# Patient Record
Sex: MALE | Race: WHITE | NOT HISPANIC OR LATINO
[De-identification: names, ages, dates, MRNs, and addresses within clinical notes are randomized per-mention and may not be internally consistent; named-entity substitution may affect disease eponyms.]

---
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
## 2023-02-15 ENCOUNTER — TRANSCRIPTION ENCOUNTER (OUTPATIENT)
Age: 18
End: 2023-02-15

## 2023-02-15 ENCOUNTER — RX RENEWAL (OUTPATIENT)
Age: 18
End: 2023-02-15

## 2023-02-15 RX ORDER — FLUOXETINE HYDROCHLORIDE 40 MG/1
40 CAPSULE ORAL
Qty: 90 | Refills: 0 | Status: ACTIVE | COMMUNITY
Start: 2021-11-09 | End: 1900-01-01

## 2023-03-15 ENCOUNTER — APPOINTMENT (OUTPATIENT)
Dept: PLASTIC SURGERY | Facility: CLINIC | Age: 18
End: 2023-03-15
Payer: COMMERCIAL

## 2023-03-15 PROCEDURE — 99213 OFFICE O/P EST LOW 20 MIN: CPT

## 2023-03-22 NOTE — HISTORY OF PRESENT ILLNESS
[FreeTextEntry1] : 17-year-old man returns almost 2 years following top surgery with free nipple grafting.  He expresses concern regarding residual lateral excess chest tissue with dog earring.  He notes the tissue pokes into his arms when he walks.

## 2023-03-22 NOTE — PHYSICAL EXAM
[de-identified] : Incisions well-healed.  Prominent standing cutaneous deformities at the bilateral lateral chest at lateral extent of scars.  There is significant lateral chest soft tissue excess beyond the standing cutaneous deformity.

## 2023-03-22 NOTE — ASSESSMENT
[FreeTextEntry1] : We will plan for revision of standing cutaneous deformities of the bilateral chest.  It was reviewed with the patient that he will continue to have significant lateral chest wall soft tissue excess due to his overall body habitus.  We will plan to do these revisions as an office procedure, one side at a time.

## 2023-03-22 NOTE — REASON FOR VISIT
[FreeTextEntry1] : Dos - 4/26/21 s/p bilateral subtotal subcutaneous mastectomy with free nipple grafting.

## 2023-06-07 ENCOUNTER — APPOINTMENT (OUTPATIENT)
Dept: FAMILY MEDICINE | Facility: CLINIC | Age: 18
End: 2023-06-07
Payer: COMMERCIAL

## 2023-06-07 VITALS
BODY MASS INDEX: 32.78 KG/M2 | OXYGEN SATURATION: 98 % | HEIGHT: 64 IN | TEMPERATURE: 97.9 F | DIASTOLIC BLOOD PRESSURE: 78 MMHG | WEIGHT: 192 LBS | HEART RATE: 60 BPM | SYSTOLIC BLOOD PRESSURE: 108 MMHG

## 2023-06-07 DIAGNOSIS — F41.1 GENERALIZED ANXIETY DISORDER: ICD-10-CM

## 2023-06-07 PROCEDURE — 99213 OFFICE O/P EST LOW 20 MIN: CPT

## 2023-06-07 RX ORDER — DOXYCYCLINE 40 MG/1
40 CAPSULE ORAL
Qty: 90 | Refills: 0 | Status: DISCONTINUED | COMMUNITY
Start: 2021-11-24 | End: 2023-06-07

## 2023-06-07 RX ORDER — AMOXICILLIN AND CLAVULANATE POTASSIUM 875; 125 MG/1; MG/1
875-125 TABLET, COATED ORAL
Qty: 20 | Refills: 0 | Status: DISCONTINUED | COMMUNITY
Start: 2022-12-29 | End: 2023-06-07

## 2023-06-07 RX ORDER — CIPROFLOXACIN AND DEXAMETHASONE 3; 1 MG/ML; MG/ML
0.3-0.1 SUSPENSION/ DROPS AURICULAR (OTIC) TWICE DAILY
Qty: 1 | Refills: 0 | Status: DISCONTINUED | COMMUNITY
Start: 2022-12-29 | End: 2023-06-07

## 2023-06-07 NOTE — HISTORY OF PRESENT ILLNESS
[FreeTextEntry1] : Patient states he is having a left side mastectomy revision. It is an in office procedure.  He is having anxiety.  They will only be using local anesthesia.

## 2023-06-07 NOTE — PHYSICAL EXAM
[Normal] : normal rate, regular rhythm, normal S1 and S2 and no murmur heard [No Focal Deficits] : no focal deficits [Normal Gait] : normal gait [Normal Affect] : the affect was normal [Alert and Oriented x3] : oriented to person, place, and time [Normal Insight/Judgement] : insight and judgment were intact

## 2023-06-09 ENCOUNTER — APPOINTMENT (OUTPATIENT)
Dept: PLASTIC SURGERY | Facility: CLINIC | Age: 18
End: 2023-06-09
Payer: COMMERCIAL

## 2023-06-09 PROCEDURE — 13101 CMPLX RPR TRUNK 2.6-7.5 CM: CPT

## 2023-06-09 PROCEDURE — 13102 CMPLX RPR TRUNK ADDL 5CM/<: CPT

## 2023-06-09 NOTE — ASSESSMENT
[FreeTextEntry1] : KRISTIN PIERRE is a 18 year male who presents today Jun 07, 2023 with preoperative anxiety for a planned revision of cutaneous deformities of bilateral chest scheduled June 9 2023.\par \par Preoperative anxiety\par - Continue Fluoxetine 40mg Daily\par - Will give short supply of Lorazepam 0.5 mg BID PRN for severe anxiety/panic attacks\par - Patient instructed to inform surgeon of medication to ensure no contraindication to surgery. Patient instructed to keep medication in a safe location where others cannot reach it and discard unused/left over medication to a drug take back program. Patient educated on side effects like drowsiness, dizziness, loss of coordination, nausea, and/or confusion. Educated patient on importance of refraining from taking benzodiazepines with alcohol or driving. Educated patient to take medication only as prescribed and risk of addiction, withdrawal symptoms after long-term treatment, and rebound anxiety after short term treatment. I recommend incorporating non pharmacological interventions to reduce symptoms of anxiety and thereby improve functioning such as mediation, deep breathing, exercise, yoga, journaling, music/art therapy.

## 2023-06-09 NOTE — HISTORY OF PRESENT ILLNESS
[FreeTextEntry8] : KRISTIN PIERRE is a 18 year male who presents today Jun 07, 2023 with preoperative anxiety.\par \par Patient is having revision  of cutaneous deformities of bilateral chest June 9 2023. He is s/p bilateral subtotal subcutaneous mastectomy with free nipple grafting 4/23/21. He had generalized anxiety and takes fluoxetine 40mg daily. He has been very anxious for his upcoming procedure because he will be awake with local anesthesia. \par \par Patient denies any lightheadedness, dizziness, chest pain, palpitations, shortness or breath, or lower leg edema.

## 2023-06-11 NOTE — ASSESSMENT
[FreeTextEntry1] : Wound care and activity restrictions were reviewed.  Follow-up in 1 to 2 weeks for reassessment.

## 2023-06-11 NOTE — PROCEDURE
[Nl] : None [FreeTextEntry1] : Right chest skin excess. [FreeTextEntry2] : Right chest scar revision 8 cm [FreeTextEntry6] : Risks, benefits, and alternatives to right chest scar revision were discussed with the patient. Specific risks of bleeding, infection, hematoma, seroma, recurrence, damage to nearby structures, permanent scarring, chronic pain, and need for additional procedures, among other risks, were discussed the patient and all questions were answered. The patient consented to the procedure.\par \par The right chest was prepped with chlorhexidine and field block performed with 1% lidocaine with epinephrine buffered 5:1 with bicarbonate 8.6%. A total of 60 cc were infiltrated. After anesthesia of the area was confirmed, a lentiform incision was made around the excess skin.  Electrocautery was used to deepen the incision down to the Jenifer's layer.  The skin excess was then excised.  Hemostasis was obtained electrocautery.\par \par The wound was irrigated, closed in layers, and steri strips and a sterile dressing applied.  Total closure length was 8 cm.

## 2023-06-12 ENCOUNTER — APPOINTMENT (OUTPATIENT)
Dept: INTERNAL MEDICINE | Facility: CLINIC | Age: 18
End: 2023-06-12
Payer: COMMERCIAL

## 2023-06-12 VITALS
WEIGHT: 192 LBS | DIASTOLIC BLOOD PRESSURE: 62 MMHG | TEMPERATURE: 98.5 F | OXYGEN SATURATION: 98 % | BODY MASS INDEX: 32.78 KG/M2 | RESPIRATION RATE: 14 BRPM | HEIGHT: 64 IN | SYSTOLIC BLOOD PRESSURE: 110 MMHG | HEART RATE: 59 BPM

## 2023-06-12 DIAGNOSIS — R79.89 OTHER SPECIFIED ABNORMAL FINDINGS OF BLOOD CHEMISTRY: ICD-10-CM

## 2023-06-12 DIAGNOSIS — E66.3 OVERWEIGHT: ICD-10-CM

## 2023-06-12 DIAGNOSIS — Z91.89 OTHER SPECIFIED PERSONAL RISK FACTORS, NOT ELSEWHERE CLASSIFIED: ICD-10-CM

## 2023-06-12 DIAGNOSIS — Z78.9 OTHER SPECIFIED HEALTH STATUS: ICD-10-CM

## 2023-06-12 DIAGNOSIS — Z00.00 ENCOUNTER FOR GENERAL ADULT MEDICAL EXAMINATION W/OUT ABNORMAL FINDINGS: ICD-10-CM

## 2023-06-12 PROCEDURE — 36415 COLL VENOUS BLD VENIPUNCTURE: CPT

## 2023-06-12 PROCEDURE — 99395 PREV VISIT EST AGE 18-39: CPT | Mod: 25

## 2023-06-12 NOTE — HISTORY OF PRESENT ILLNESS
[FreeTextEntry1] : patient presents for physical\par  [de-identified] : KRISTIN PIERRE is a transgender 18 year male who presents today Jun 12, 2023 for wellness exam. \par \par His revision of chest wall surgery went well. He took the lorazepam only for preoperative anxiety. He is planning to take it again for his next surgery. \par He is on testosterone injection every 2 weeks. He was following up with a pediatric endocrinologist , but looking to transfer care to an adult endocrinologist. \par He speaks therapist once a week. He feels like Prozac is not working anymore. 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 \par \par \par \par

## 2023-06-12 NOTE — PHYSICAL EXAM
[Normal] : no rash [Coordination Grossly Intact] : coordination grossly intact [No Focal Deficits] : no focal deficits [Normal Gait] : normal gait [Normal Affect] : the affect was normal [Alert and Oriented x3] : oriented to person, place, and time [Normal Insight/Judgement] : insight and judgment were intact

## 2023-06-12 NOTE — ASSESSMENT
[FreeTextEntry1] : KRISTIN PIERRE is a 18 year transgender male who presents today Jun 12, 2023 for wellness exam.\par \par Health Maintenance \par -  Continue well balanced meals, daily exercise, and stress reduction techniques for overall health and wellness.\par - Continue annual eye exams, biannual dental exams\par \par Hormone Replacement\par - Continue testosterone 120mg (0.5ml) every 2 weeks\par - Will refer to adult endocrinology \par \par Anxiety\par - KIRK score- 11\par - PHQ 9 score- 4\par - Continue Fluoxetine 40mg\par - Lorazepam 0.5mg PRN preoperative anxiety only. \par - WiIl refer to behavioral health

## 2023-06-12 NOTE — HEALTH RISK ASSESSMENT
[Very Good] : ~his/her~ current health as very good [Good] : ~his/her~  mood as  good [Yes] : Yes [Monthly or less (1 pt)] : Monthly or less (1 point) [1 or 2 (0 pts)] : 1 or 2 (0 points) [Never (0 pts)] : Never (0 points) [No falls in past year] : Patient reported no falls in the past year [Student] : student [College] : College [Single] : single [Feels Safe at Home] : Feels safe at home [Fully functional (bathing, dressing, toileting, transferring, walking, feeding)] : Fully functional (bathing, dressing, toileting, transferring, walking, feeding) [Fully functional (using the telephone, shopping, preparing meals, housekeeping, doing laundry, using] : Fully functional and needs no help or supervision to perform IADLs (using the telephone, shopping, preparing meals, housekeeping, doing laundry, using transportation, managing medications and managing finances) [Smoke Detector] : smoke detector [Carbon Monoxide Detector] : carbon monoxide detector [Seat Belt] :  uses seat belt [Sunscreen] : uses sunscreen [No] : In the past 12 months have you used drugs other than those required for medical reasons? No [0] : 1) Little interest or pleasure doing things: Not at all (0) [1] : 2) Feeling down, depressed, or hopeless for several days (1) [PHQ-2 Negative - No further assessment needed] : PHQ-2 Negative - No further assessment needed [None] : None [Reports normal functional visual acuity (ie: able to read med bottle)] : Reports normal functional visual acuity [de-identified] : Endocrinology  [Audit-CScore] : 1 [de-identified] : walks 30 mins 3 times a week [de-identified] : regular  [[id number]] : 1 [Change in mental status noted] : No change in mental status noted [Language] : denies difficulty with language [Learning/Retaining New Information] : denies difficulty learning/retaining new information [Handling Complex Tasks] : denies difficulty handling complex tasks [Reasoning] : denies difficulty with reasoning [Spatial Ability and Orientation] : denies difficulty with spatial ability and orientation [Reports changes in hearing] : Reports no changes in hearing [Reports changes in vision] : Reports no changes in vision [Reports changes in dental health] : Reports no changes in dental health [de-identified] : Will be graduating in 2025 from [employer] in Education [de-identified] : wears glassess

## 2023-06-13 LAB
25(OH)D3 SERPL-MCNC: 31.5 NG/ML
ALBUMIN SERPL ELPH-MCNC: 4.8 G/DL
ALP BLD-CCNC: 58 U/L
ALT SERPL-CCNC: 67 U/L
ANION GAP SERPL CALC-SCNC: 10 MMOL/L
AST SERPL-CCNC: 37 U/L
BILIRUB SERPL-MCNC: 1 MG/DL
BUN SERPL-MCNC: 11 MG/DL
CALCIUM SERPL-MCNC: 9.8 MG/DL
CHLORIDE SERPL-SCNC: 101 MMOL/L
CHOLEST SERPL-MCNC: 240 MG/DL
CO2 SERPL-SCNC: 28 MMOL/L
CREAT SERPL-MCNC: 0.99 MG/DL
EGFR: 112 ML/MIN/1.73M2
GLUCOSE SERPL-MCNC: 108 MG/DL
HDLC SERPL-MCNC: 38 MG/DL
LDLC SERPL CALC-MCNC: NORMAL MG/DL
NONHDLC SERPL-MCNC: 202 MG/DL
POTASSIUM SERPL-SCNC: 5.1 MMOL/L
PROT SERPL-MCNC: 7.1 G/DL
SODIUM SERPL-SCNC: 140 MMOL/L
TRIGL SERPL-MCNC: 450 MG/DL
TSH SERPL-ACNC: 1.6 UIU/ML

## 2023-06-14 LAB — APO LP(A) SERPL-MCNC: 23.1 NMOL/L

## 2023-06-21 ENCOUNTER — APPOINTMENT (OUTPATIENT)
Dept: PLASTIC SURGERY | Facility: CLINIC | Age: 18
End: 2023-06-21
Payer: COMMERCIAL

## 2023-06-21 PROCEDURE — 99024 POSTOP FOLLOW-UP VISIT: CPT

## 2023-06-22 ENCOUNTER — TRANSCRIPTION ENCOUNTER (OUTPATIENT)
Age: 18
End: 2023-06-22

## 2023-06-22 NOTE — PHYSICAL EXAM
[de-identified] : Incision intact.  No erythema or induration.  Minimal ecchymoses.  Mild persistent soft tissue excess on left.

## 2023-06-22 NOTE — HISTORY OF PRESENT ILLNESS
[FreeTextEntry1] : The patient returns 13 days following right chest scar revision.  He states he is happier with the appearance of the area.

## 2023-06-22 NOTE — ASSESSMENT
[FreeTextEntry1] : Healing well following right chest scar revision.  The patient is interested in proceeding with the left side.  We will submit for insurance authorization.

## 2023-06-22 NOTE — REASON FOR VISIT
[Follow-Up: _____] : a [unfilled] follow-up visit [FreeTextEntry1] : 6/9/2023 - excision of right chest standing cutaneous deformity

## 2023-06-23 ENCOUNTER — APPOINTMENT (OUTPATIENT)
Dept: CARDIOLOGY | Facility: CLINIC | Age: 18
End: 2023-06-23
Payer: COMMERCIAL

## 2023-06-23 ENCOUNTER — NON-APPOINTMENT (OUTPATIENT)
Age: 18
End: 2023-06-23

## 2023-06-23 VITALS
HEIGHT: 63 IN | OXYGEN SATURATION: 98 % | DIASTOLIC BLOOD PRESSURE: 66 MMHG | HEART RATE: 70 BPM | SYSTOLIC BLOOD PRESSURE: 120 MMHG | BODY MASS INDEX: 35.08 KG/M2 | WEIGHT: 198 LBS

## 2023-06-23 DIAGNOSIS — E78.5 HYPERLIPIDEMIA, UNSPECIFIED: ICD-10-CM

## 2023-06-23 PROCEDURE — 99203 OFFICE O/P NEW LOW 30 MIN: CPT | Mod: 25

## 2023-06-23 PROCEDURE — 93000 ELECTROCARDIOGRAM COMPLETE: CPT

## 2023-06-23 NOTE — HISTORY OF PRESENT ILLNESS
[FreeTextEntry1] : 18 year old transgender (Female-->male) presents for a cardiac evaluation. Hypertriglyceridemia over 300 since at least 2019. Patient now on testosterone therapy, which was started in 2020, however elevated triglyceride levels in the 300s were seen prior to this.\par \par No history of pancreatitis.\par \par Patient is college student [employer]. During the school year diet is unhealthy.\par \par Patient has no chest pain, dyspnea or palpitations.\par \par There is no known family history in first degree relatives of cardiovascular disease.\par \par There is no history of MI, CVA, CHF, or previous coronary intervention.\par

## 2023-06-23 NOTE — DISCUSSION/SUMMARY
[FreeTextEntry1] : 1. Hypertriglyceridemia: patient with no history of pancreatitis. Reviewed lipid panels dating back to 2019. Persistently elevated. Recommend low fat diet. Start Lovaza 2gm BID. Recheck lipid panel in 2 months.\par \par Follow up prior to returning to school in the fall. [EKG obtained to assist in diagnosis and management of assessed problem(s)] : EKG obtained to assist in diagnosis and management of assessed problem(s)

## 2023-06-26 ENCOUNTER — TRANSCRIPTION ENCOUNTER (OUTPATIENT)
Age: 18
End: 2023-06-26

## 2023-06-27 ENCOUNTER — TRANSCRIPTION ENCOUNTER (OUTPATIENT)
Age: 18
End: 2023-06-27

## 2023-07-25 ENCOUNTER — RX CHANGE (OUTPATIENT)
Age: 18
End: 2023-07-25

## 2023-07-31 ENCOUNTER — APPOINTMENT (OUTPATIENT)
Dept: TRANSGENDER CARE | Facility: CLINIC | Age: 18
End: 2023-07-31

## 2023-07-31 ENCOUNTER — APPOINTMENT (OUTPATIENT)
Dept: TRANSGENDER CARE | Facility: CLINIC | Age: 18
End: 2023-07-31
Payer: COMMERCIAL

## 2023-07-31 ENCOUNTER — NON-APPOINTMENT (OUTPATIENT)
Age: 18
End: 2023-07-31

## 2023-07-31 VITALS
TEMPERATURE: 97.7 F | HEIGHT: 63 IN | WEIGHT: 195 LBS | OXYGEN SATURATION: 98 % | DIASTOLIC BLOOD PRESSURE: 76 MMHG | RESPIRATION RATE: 16 BRPM | SYSTOLIC BLOOD PRESSURE: 115 MMHG | HEART RATE: 60 BPM | BODY MASS INDEX: 34.55 KG/M2

## 2023-07-31 DIAGNOSIS — F39 UNSPECIFIED MOOD [AFFECTIVE] DISORDER: ICD-10-CM

## 2023-07-31 DIAGNOSIS — Z79.890 HORMONE REPLACEMENT THERAPY: ICD-10-CM

## 2023-07-31 DIAGNOSIS — E55.9 VITAMIN D DEFICIENCY, UNSPECIFIED: ICD-10-CM

## 2023-07-31 DIAGNOSIS — L70.9 ACNE, UNSPECIFIED: ICD-10-CM

## 2023-07-31 PROCEDURE — 36415 COLL VENOUS BLD VENIPUNCTURE: CPT

## 2023-07-31 PROCEDURE — 99205 OFFICE O/P NEW HI 60 MIN: CPT | Mod: 25

## 2023-07-31 RX ORDER — LORAZEPAM 0.5 MG/1
0.5 TABLET ORAL TWICE DAILY
Qty: 10 | Refills: 0 | Status: DISCONTINUED | COMMUNITY
Start: 2023-06-07 | End: 2023-07-31

## 2023-07-31 RX ORDER — DAPSONE 50 MG/G
5 GEL TOPICAL
Qty: 60 | Refills: 0 | Status: ACTIVE | COMMUNITY
Start: 2021-11-15

## 2023-07-31 RX ORDER — TRIFAROTENE 50 UG/G
0.01 CREAM TOPICAL
Qty: 45 | Refills: 0 | Status: ACTIVE | COMMUNITY
Start: 2021-11-24

## 2023-08-01 LAB
ANTI-MUELLERIAN HORMONE: 3.88 NG/ML
ESTIMATED AVERAGE GLUCOSE: 100 MG/DL
ESTRADIOL SERPL-MCNC: 27 PG/ML
FSH SERPL-MCNC: 6.5 IU/L
HBA1C MFR BLD HPLC: 5.1 %
HBV SURFACE AG SER QL: NONREACTIVE
HCV AB SER QL: NONREACTIVE
HCV S/CO RATIO: 0.11 S/CO
HIV1+2 AB SPEC QL IA.RAPID: NONREACTIVE
LH SERPL-ACNC: 8.3 IU/L
TSH SERPL-ACNC: 1.89 UIU/ML

## 2023-08-02 LAB — TESTOST SERPL-MCNC: 210 NG/DL

## 2023-08-07 NOTE — HISTORY OF PRESENT ILLNESS
[FreeTextEntry1] : YOVANI PIERRE is a 18 year old, transmale seen on 2023 for intial transgender care program intake visit.  Preferred Pronouns: he/him Sexual orientation: pansexual Gender identity: male Assigned female at birth Specific Terms for Body Parts:  top/bottom Call pt: Yovani  Reason for Appointment: Yovani is a 18 year old AFAB trans man (he/him) interested in transferring GAHT. Dr. Schumacher.  COVID Screenin COVID shots. 1+ MPX shots.  Transition HX + Present Life: He felt different in middle school, but didn't have the language to describe it, then started watching YouTube videos in 10th Grade from trans people and started coming to terms. He came out to friends , then got top surgery . Friends are very supportive and many are part of the LGBTQ+ community. He relays that his family is very supportive; his mother was right away, his father took some time but now he is supportive. He lives with his mother, brother, and mother's bf and feels safe at home. Food secure and reliable transportation.  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 He is out and people are supportive.  Mental Health: He is dxed with anxiety and depression. Takes Prozac 40mg from his PCP. Ernesto Strickland is his therapist. He will be seeing a psychiatrist at [facility] starting next week. Reports no sleeping concerns. Never been inpatient. Mother has OCD tendencies and father has some anxiety.  Psychiatry:  Psychology:   History of mental health admission:  History of Suicidal/homicidal ideation & Self harm:    Endocrinology, Primary Care, GYN: He has been on GAHT 2020-now. No personal or family hx of endo disorders. Never been to a GYN; would like referrals.  Coping: He likes to make art, go on walks, hang out with pets, and take showers to cope with stress. He turns to mother, brother, and friends for support.  Feelings of Gender Dysphoria/ Body Dysmorphia: He relays that a lot of his dysphoria has gone away, but he sometimes feels dysphoric when comparing himself to cis men.  Tattoos/ Piercing: Prof done.  Cigarette, Vaping, Marijuana, Alcohol, and Drug Use: He drinks alcohol weekly and smokes cannabis and vapes nicotine a few times a week. Reports no other substance use.  Reproductive Endocrinology: No interest.  Gender Affirming Surgery: He had top surgery in 2021 with Dr. Licona. Revision scheduled for 2023. Not currently interested in other surgeries.  Name Change + Gender Marker: Done.  Nutrition: Reports no nutritional concerns. 5'3, 195 lbs. 3 meals. He walks 3 times a week and swims once a week.  Sexual Health: He is not in a relationship and was last sexually active 2 months ago. 5 partners in the past year. Reports no STI/HIV symptoms.  Goals of Trans care: 1) Transfer GAHT for adult servcies

## 2023-08-07 NOTE — SOCIAL HISTORY
[Sexually Active] : The patient is sexually active [Sometimes] : sometimes [Vaginal] : vaginal [Oral-Receptive (pt. mouth)] : oral-receptive (pt. mouth) [To Pt Genitalia] : pt genitalia [Male ___] : [unfilled] male [Date of most recent sexual encounter ___] : ~His/Her~ most recent sexual encounter was [unfilled] [Partnership for Health – Safe Sex Evidence Based Intervention] : The Partnership for Health Safe Sex Evidence Based Intervention was applied [Loss Frame Message] :  and a loss frame message was provided.

## 2023-08-11 ENCOUNTER — APPOINTMENT (OUTPATIENT)
Dept: PLASTIC SURGERY | Facility: CLINIC | Age: 18
End: 2023-08-11
Payer: COMMERCIAL

## 2023-08-11 DIAGNOSIS — R22.9 LOCALIZED SWELLING, MASS AND LUMP, UNSPECIFIED: ICD-10-CM

## 2023-08-11 PROCEDURE — 13101 CMPLX RPR TRUNK 2.6-7.5 CM: CPT

## 2023-08-11 PROCEDURE — 13102 CMPLX RPR TRUNK ADDL 5CM/<: CPT

## 2023-08-11 NOTE — PROCEDURE
[Nl] : None [FreeTextEntry1] : Gender dysphoria; left chest soft tissue excess [FreeTextEntry2] : Excision and repair left chest soft tissue excess 14 cm [FreeTextEntry6] : Risks, benefits, and alternatives to excision of the area were discussed with the patient. Specific risks of bleeding, infection, hematoma, seroma, recurrence, damage to nearby structures, permanent scarring, chronic pain, and need for additional procedures, among other risks, were discussed the patient and all questions were answered. The patient consented to the procedure.  The left chest was prepped with povidine iodine and field block performed with 1% lidocaine with epinephrine buffered 5:1 with bicarbonate 8.6%. A total of 60 cc were infiltrated. After anesthesia of the area was confirmed, a lentiform incision was made around the soft tissue excess, which was then excised down to the level of the superficial chest wall fascia.  Hemostasis was obtained electrocautery and noted to be excellent.  The wound was irrigated, closed in layers, and steri strips and a sterile dressing applied.  Total closure length was 14 cm

## 2023-08-14 ENCOUNTER — NON-APPOINTMENT (OUTPATIENT)
Age: 18
End: 2023-08-14

## 2023-08-14 LAB
ALBUMIN SERPL ELPH-MCNC: 5.3 G/DL
ALP BLD-CCNC: 62 U/L
ALT SERPL-CCNC: 83 U/L
ANION GAP SERPL CALC-SCNC: 14 MMOL/L
AST SERPL-CCNC: 51 U/L
BILIRUB SERPL-MCNC: 1.2 MG/DL
BUN SERPL-MCNC: 16 MG/DL
CALCIUM SERPL-MCNC: 10.5 MG/DL
CHLORIDE SERPL-SCNC: 98 MMOL/L
CO2 SERPL-SCNC: 26 MMOL/L
CREAT SERPL-MCNC: 0.99 MG/DL
DHEA-SULFATE, SERUM: 584 UG/DL
EGFR: 112 ML/MIN/1.73M2
GLUCOSE SERPL-MCNC: 87 MG/DL
HCT VFR BLD CALC: 50.4 %
HGB BLD-MCNC: 17.7 G/DL
MCHC RBC-ENTMCNC: 32.1 PG
MCHC RBC-ENTMCNC: 35.1 GM/DL
MCV RBC AUTO: 91.3 FL
PLATELET # BLD AUTO: 361 K/UL
POTASSIUM SERPL-SCNC: 4.3 MMOL/L
PROT SERPL-MCNC: 8 G/DL
RBC # BLD: 5.52 M/UL
RBC # FLD: 12.5 %
SODIUM SERPL-SCNC: 138 MMOL/L
WBC # FLD AUTO: 9.24 K/UL

## 2023-08-18 ENCOUNTER — APPOINTMENT (OUTPATIENT)
Dept: PLASTIC SURGERY | Facility: CLINIC | Age: 18
End: 2023-08-18

## 2023-08-24 ENCOUNTER — APPOINTMENT (OUTPATIENT)
Dept: CARDIOLOGY | Facility: CLINIC | Age: 18
End: 2023-08-24
Payer: COMMERCIAL

## 2023-08-24 VITALS
HEIGHT: 63 IN | OXYGEN SATURATION: 98 % | WEIGHT: 190 LBS | HEART RATE: 90 BPM | SYSTOLIC BLOOD PRESSURE: 122 MMHG | BODY MASS INDEX: 33.66 KG/M2 | DIASTOLIC BLOOD PRESSURE: 66 MMHG

## 2023-08-24 DIAGNOSIS — E78.2 MIXED HYPERLIPIDEMIA: ICD-10-CM

## 2023-08-24 DIAGNOSIS — Z13.6 ENCOUNTER FOR SCREENING FOR CARDIOVASCULAR DISORDERS: ICD-10-CM

## 2023-08-24 PROCEDURE — 99213 OFFICE O/P EST LOW 20 MIN: CPT

## 2023-08-24 NOTE — DISCUSSION/SUMMARY
[FreeTextEntry1] : 1. Hypertriglyceridemia: patient with no history of pancreatitis. Reviewed lipid panels dating back to 2019. Persistently elevated. Recommend low fat diet. Continue Lovaza 2gm BID. Will obtain most recent lipid panel  Follow up in 1 year.

## 2023-08-24 NOTE — HISTORY OF PRESENT ILLNESS
[FreeTextEntry1] : Historical Perspective: 18 year old transgender (Female-->male) presents for a cardiac evaluation. Hypertriglyceridemia over 300 since at least 2019. Patient now on testosterone therapy, which was started in 2020, however elevated triglyceride levels in the 300s were seen prior to this.  No history of pancreatitis.  Patient is college student [employer]. During the school year diet is unhealthy.  Patient has no chest pain, dyspnea or palpitations.  There is no known family history in first degree relatives of cardiovascular disease.  There is no history of MI, CVA, CHF, or previous coronary intervention.  Current Health Status: Patient with no chest pain, SOB, or palpitations. No hospitalizations since seeing me last. Remains compliant with his medications and reports no adverse effects.

## 2023-09-06 ENCOUNTER — NON-APPOINTMENT (OUTPATIENT)
Age: 18
End: 2023-09-06

## 2023-11-03 ENCOUNTER — TRANSCRIPTION ENCOUNTER (OUTPATIENT)
Age: 18
End: 2023-11-03

## 2023-11-22 ENCOUNTER — APPOINTMENT (OUTPATIENT)
Dept: TRANSGENDER CARE | Facility: CLINIC | Age: 18
End: 2023-11-22
Payer: COMMERCIAL

## 2023-11-22 ENCOUNTER — TRANSCRIPTION ENCOUNTER (OUTPATIENT)
Age: 18
End: 2023-11-22

## 2023-11-22 VITALS
HEART RATE: 88 BPM | WEIGHT: 188 LBS | SYSTOLIC BLOOD PRESSURE: 128 MMHG | OXYGEN SATURATION: 98 % | DIASTOLIC BLOOD PRESSURE: 70 MMHG | HEIGHT: 63 IN | BODY MASS INDEX: 33.31 KG/M2 | TEMPERATURE: 98.5 F

## 2023-11-22 PROCEDURE — 36415 COLL VENOUS BLD VENIPUNCTURE: CPT

## 2023-11-22 PROCEDURE — 99214 OFFICE O/P EST MOD 30 MIN: CPT | Mod: 25

## 2023-11-22 RX ORDER — TOPIRAMATE 25 MG/1
25 TABLET, FILM COATED ORAL DAILY
Refills: 0 | Status: ACTIVE | COMMUNITY
Start: 2023-11-22

## 2023-11-22 RX ORDER — LORAZEPAM 0.5 MG/1
0.5 TABLET ORAL
Qty: 15 | Refills: 0 | Status: ACTIVE | COMMUNITY
Start: 2023-11-22

## 2023-11-23 ENCOUNTER — TRANSCRIPTION ENCOUNTER (OUTPATIENT)
Age: 18
End: 2023-11-23

## 2023-11-23 LAB
25(OH)D3 SERPL-MCNC: 28.5 NG/ML
ALBUMIN SERPL ELPH-MCNC: 5.4 G/DL
ALP BLD-CCNC: 66 U/L
ALT SERPL-CCNC: 49 U/L
ANION GAP SERPL CALC-SCNC: 12 MMOL/L
AST SERPL-CCNC: 31 U/L
BASOPHILS # BLD AUTO: 0.06 K/UL
BASOPHILS NFR BLD AUTO: 0.7 %
BILIRUB SERPL-MCNC: 1.2 MG/DL
BUN SERPL-MCNC: 13 MG/DL
CALCIUM SERPL-MCNC: 10.3 MG/DL
CHLORIDE SERPL-SCNC: 102 MMOL/L
CHOLEST SERPL-MCNC: 242 MG/DL
CO2 SERPL-SCNC: 26 MMOL/L
CREAT SERPL-MCNC: 0.86 MG/DL
EGFR: 127 ML/MIN/1.73M2
EOSINOPHIL # BLD AUTO: 0.11 K/UL
EOSINOPHIL NFR BLD AUTO: 1.3 %
ESTIMATED AVERAGE GLUCOSE: 108 MG/DL
ESTRADIOL SERPL-MCNC: 25 PG/ML
GLUCOSE SERPL-MCNC: 87 MG/DL
HBA1C MFR BLD HPLC: 5.4 %
HCT VFR BLD CALC: 51.6 %
HDLC SERPL-MCNC: 43 MG/DL
HGB BLD-MCNC: 17.5 G/DL
IMM GRANULOCYTES NFR BLD AUTO: 0.2 %
LDLC SERPL CALC-MCNC: 140 MG/DL
LYMPHOCYTES # BLD AUTO: 2.11 K/UL
LYMPHOCYTES NFR BLD AUTO: 24.8 %
MAN DIFF?: NORMAL
MCHC RBC-ENTMCNC: 30.3 PG
MCHC RBC-ENTMCNC: 33.9 GM/DL
MCV RBC AUTO: 89.3 FL
MONOCYTES # BLD AUTO: 0.43 K/UL
MONOCYTES NFR BLD AUTO: 5.1 %
NEUTROPHILS # BLD AUTO: 5.78 K/UL
NEUTROPHILS NFR BLD AUTO: 67.9 %
NONHDLC SERPL-MCNC: 200 MG/DL
PLATELET # BLD AUTO: 370 K/UL
POTASSIUM SERPL-SCNC: 4.4 MMOL/L
PROT SERPL-MCNC: 8 G/DL
RBC # BLD: 5.78 M/UL
RBC # FLD: 12.7 %
SODIUM SERPL-SCNC: 140 MMOL/L
TESTOST SERPL-MCNC: 445 NG/DL
TRIGL SERPL-MCNC: 326 MG/DL
TSH SERPL-ACNC: 1.15 UIU/ML
WBC # FLD AUTO: 8.51 K/UL

## 2023-11-24 ENCOUNTER — TRANSCRIPTION ENCOUNTER (OUTPATIENT)
Age: 18
End: 2023-11-24

## 2023-12-20 ENCOUNTER — OUTPATIENT (OUTPATIENT)
Dept: OUTPATIENT SERVICES | Facility: HOSPITAL | Age: 18
LOS: 1 days | Discharge: HOME | End: 2023-12-20

## 2023-12-20 DIAGNOSIS — D45 POLYCYTHEMIA VERA: ICD-10-CM

## 2024-01-02 ENCOUNTER — RESULT REVIEW (OUTPATIENT)
Age: 19
End: 2024-01-02

## 2024-01-02 ENCOUNTER — APPOINTMENT (OUTPATIENT)
Dept: HEMATOLOGY ONCOLOGY | Facility: CLINIC | Age: 19
End: 2024-01-02
Payer: COMMERCIAL

## 2024-01-02 VITALS
OXYGEN SATURATION: 100 % | HEIGHT: 63.07 IN | WEIGHT: 188.91 LBS | SYSTOLIC BLOOD PRESSURE: 118 MMHG | BODY MASS INDEX: 33.47 KG/M2 | HEART RATE: 50 BPM | DIASTOLIC BLOOD PRESSURE: 72 MMHG | TEMPERATURE: 97.6 F | RESPIRATION RATE: 16 BRPM

## 2024-01-02 LAB
BASOPHILS # BLD AUTO: 0.04 K/UL — SIGNIFICANT CHANGE UP (ref 0–0.2)
BASOPHILS # BLD AUTO: 0.04 K/UL — SIGNIFICANT CHANGE UP (ref 0–0.2)
BASOPHILS NFR BLD AUTO: 0.5 % — SIGNIFICANT CHANGE UP (ref 0–2)
BASOPHILS NFR BLD AUTO: 0.5 % — SIGNIFICANT CHANGE UP (ref 0–2)
EOSINOPHIL # BLD AUTO: 0.16 K/UL — SIGNIFICANT CHANGE UP (ref 0–0.5)
EOSINOPHIL # BLD AUTO: 0.16 K/UL — SIGNIFICANT CHANGE UP (ref 0–0.5)
EOSINOPHIL NFR BLD AUTO: 2.1 % — SIGNIFICANT CHANGE UP (ref 0–6)
EOSINOPHIL NFR BLD AUTO: 2.1 % — SIGNIFICANT CHANGE UP (ref 0–6)
HCT VFR BLD CALC: 47.6 % — SIGNIFICANT CHANGE UP (ref 39–50)
HCT VFR BLD CALC: 47.6 % — SIGNIFICANT CHANGE UP (ref 39–50)
HGB BLD-MCNC: 16.9 G/DL — SIGNIFICANT CHANGE UP (ref 13–17)
HGB BLD-MCNC: 16.9 G/DL — SIGNIFICANT CHANGE UP (ref 13–17)
IMM GRANULOCYTES NFR BLD AUTO: 0.3 % — SIGNIFICANT CHANGE UP (ref 0–0.9)
IMM GRANULOCYTES NFR BLD AUTO: 0.3 % — SIGNIFICANT CHANGE UP (ref 0–0.9)
LYMPHOCYTES # BLD AUTO: 2.2 K/UL — SIGNIFICANT CHANGE UP (ref 1–3.3)
LYMPHOCYTES # BLD AUTO: 2.2 K/UL — SIGNIFICANT CHANGE UP (ref 1–3.3)
LYMPHOCYTES # BLD AUTO: 28.4 % — SIGNIFICANT CHANGE UP (ref 13–44)
LYMPHOCYTES # BLD AUTO: 28.4 % — SIGNIFICANT CHANGE UP (ref 13–44)
MCHC RBC-ENTMCNC: 31.3 PG — SIGNIFICANT CHANGE UP (ref 27–34)
MCHC RBC-ENTMCNC: 31.3 PG — SIGNIFICANT CHANGE UP (ref 27–34)
MCHC RBC-ENTMCNC: 35.5 G/DL — SIGNIFICANT CHANGE UP (ref 32–36)
MCHC RBC-ENTMCNC: 35.5 G/DL — SIGNIFICANT CHANGE UP (ref 32–36)
MCV RBC AUTO: 88.1 FL — SIGNIFICANT CHANGE UP (ref 80–100)
MCV RBC AUTO: 88.1 FL — SIGNIFICANT CHANGE UP (ref 80–100)
MONOCYTES # BLD AUTO: 0.4 K/UL — SIGNIFICANT CHANGE UP (ref 0–0.9)
MONOCYTES # BLD AUTO: 0.4 K/UL — SIGNIFICANT CHANGE UP (ref 0–0.9)
MONOCYTES NFR BLD AUTO: 5.2 % — SIGNIFICANT CHANGE UP (ref 2–14)
MONOCYTES NFR BLD AUTO: 5.2 % — SIGNIFICANT CHANGE UP (ref 2–14)
NEUTROPHILS # BLD AUTO: 4.93 K/UL — SIGNIFICANT CHANGE UP (ref 1.8–7.4)
NEUTROPHILS # BLD AUTO: 4.93 K/UL — SIGNIFICANT CHANGE UP (ref 1.8–7.4)
NEUTROPHILS NFR BLD AUTO: 63.5 % — SIGNIFICANT CHANGE UP (ref 43–77)
NEUTROPHILS NFR BLD AUTO: 63.5 % — SIGNIFICANT CHANGE UP (ref 43–77)
NRBC # BLD: 0 /100 WBCS — SIGNIFICANT CHANGE UP (ref 0–0)
NRBC # BLD: 0 /100 WBCS — SIGNIFICANT CHANGE UP (ref 0–0)
PLATELET # BLD AUTO: 303 K/UL — SIGNIFICANT CHANGE UP (ref 150–400)
PLATELET # BLD AUTO: 303 K/UL — SIGNIFICANT CHANGE UP (ref 150–400)
RBC # BLD: 5.4 M/UL — SIGNIFICANT CHANGE UP (ref 4.2–5.8)
RBC # BLD: 5.4 M/UL — SIGNIFICANT CHANGE UP (ref 4.2–5.8)
RBC # FLD: 12.5 % — SIGNIFICANT CHANGE UP (ref 10.3–14.5)
RBC # FLD: 12.5 % — SIGNIFICANT CHANGE UP (ref 10.3–14.5)
WBC # BLD: 7.75 K/UL — SIGNIFICANT CHANGE UP (ref 3.8–10.5)
WBC # BLD: 7.75 K/UL — SIGNIFICANT CHANGE UP (ref 3.8–10.5)
WBC # FLD AUTO: 7.75 K/UL — SIGNIFICANT CHANGE UP (ref 3.8–10.5)
WBC # FLD AUTO: 7.75 K/UL — SIGNIFICANT CHANGE UP (ref 3.8–10.5)

## 2024-01-02 PROCEDURE — 99205 OFFICE O/P NEW HI 60 MIN: CPT

## 2024-01-05 ENCOUNTER — LABORATORY RESULT (OUTPATIENT)
Age: 19
End: 2024-01-05

## 2024-01-05 ENCOUNTER — APPOINTMENT (OUTPATIENT)
Dept: TRANSGENDER CARE | Facility: CLINIC | Age: 19
End: 2024-01-05
Payer: COMMERCIAL

## 2024-01-05 VITALS
WEIGHT: 190 LBS | DIASTOLIC BLOOD PRESSURE: 68 MMHG | BODY MASS INDEX: 33.66 KG/M2 | TEMPERATURE: 98.3 F | SYSTOLIC BLOOD PRESSURE: 100 MMHG | OXYGEN SATURATION: 97 % | HEART RATE: 67 BPM | HEIGHT: 63 IN

## 2024-01-05 DIAGNOSIS — Z79.899 TRANSSEXUALISM: ICD-10-CM

## 2024-01-05 DIAGNOSIS — E55.9 VITAMIN D DEFICIENCY, UNSPECIFIED: ICD-10-CM

## 2024-01-05 DIAGNOSIS — E66.9 OBESITY, UNSPECIFIED: ICD-10-CM

## 2024-01-05 DIAGNOSIS — F12.90 CANNABIS USE, UNSPECIFIED, UNCOMPLICATED: ICD-10-CM

## 2024-01-05 DIAGNOSIS — R73.09 OTHER ABNORMAL GLUCOSE: ICD-10-CM

## 2024-01-05 DIAGNOSIS — F64.0 TRANSSEXUALISM: ICD-10-CM

## 2024-01-05 PROCEDURE — G2211 COMPLEX E/M VISIT ADD ON: CPT

## 2024-01-05 PROCEDURE — 36415 COLL VENOUS BLD VENIPUNCTURE: CPT

## 2024-01-05 PROCEDURE — 99215 OFFICE O/P EST HI 40 MIN: CPT | Mod: 25

## 2024-01-05 NOTE — HISTORY OF PRESENT ILLNESS
[FreeTextEntry1] : YOVANI PIERRE is a 18 year old, transmale seen on 07/31/2023 for intial transgender care program intake visit.  Preferred Pronouns: he/him Sexual orientation: pansexual Gender identity: male Assigned female at birth Specific Terms for Body Parts:  top/bottom Call pt: Yovani  Pt went to Heme/Onc and had impovved Hgb/Hct and did nto need phlepbotgyl. Doing well.  Started teststoene in 2019.  Last dose of testseroen was 12/30/23.  q2w  no problems with inejctions.  doing well.  No complaints.   Doing well in College. Jr yr.  touch semsester, but did well.

## 2024-01-05 NOTE — SOCIAL HISTORY
[Sexually Active] : The patient is sexually active [Vaginal] : vaginal [Oral-Receptive (pt. mouth)] : oral-receptive (pt. mouth) [To Pt Genitalia] : pt genitalia [Male ___] : [unfilled] male [Date of most recent sexual encounter ___] : ~His/Her~ most recent sexual encounter was [unfilled] [Partnership for Health – Safe Sex Evidence Based Intervention] : The Partnership for Health Safe Sex Evidence Based Intervention was applied [Loss Frame Message] :  and a loss frame message was provided. [Always] : always

## 2024-01-06 NOTE — REASON FOR VISIT
[Initial Consultation] : an initial consultation for [Parent] : parent [FreeTextEntry2] : polycythemia

## 2024-01-06 NOTE — ASSESSMENT
[FreeTextEntry1] : Yovani is a 18 year old transgender male (female to male) with history of hypertriglyceridemia, here for further evaluation of polycythemia. He was referred by Endocrinologist MD Mejia. Polycythemia noted in EMR since 6/12/23 H/H 17.1/51.6. Recent labs from 11/22/23 reported H/H 17.5/51.6. Patient has been on hormone replacement of testosterone since 2020. Currently taking 120mg (0.5ml) every 2 weeks. Patient was advised to have a phlebotomy done at a blood donation center, but was unable to due to polycythemia.   Plan: -Draw CBC, CMP, MICHELLE 2, EPO  -Advised patient to schedule one possible phlebotomy to obtain a HCT goal < 50 at this time.  -Patient states [facility] and would like to have phlebotomies done closer to home. Will look into this.   Patient seen and evaluated, case and management discussed with MD Josué Cobb  Attending Attestation Patient was seen and examined with NP Chiquis Weaver.  I was present in all pertinent parts of the interview and this impression is my own.  Patient with history of a secondary erythrocytosis HCT as high as 55% at times, most recently 51.6%.  Concerns over hypervisors in setting of testosterones use.  Options including halting testosterone versus observing on lower doses, and continuing current dose with therapeutic phlebotomy were offered. Patent wishes to continued current dose with  phlebotomy. Explained to patient that this does not completely eliminate the risk of cardiovascular and cerebrovascular disease from the use of testosterones, it only mitigates them roughly 50% in  studies with cis gendered men using the testosterone for hypogonadism.  Patient lives very far away in [place], would start phlebotomy here this upcoming week, to buy time to attempt to make an arrangement for patient to have phlebotomy [place] an [place] location possibly around [facility].

## 2024-01-06 NOTE — ADDENDUM
[FreeTextEntry1] : 1/3/23: KIANA Weaver: Called and reviewed CBC with patient. H/H 16.9/47.6. HCT within goal at this time. Will cancel upcoming phlebotomy appointment.  Additional labs are pending.

## 2024-01-06 NOTE — HISTORY OF PRESENT ILLNESS
[Date: ____________] : Patient's last distress assessment performed on [unfilled]. [0 - No Distress] : Distress Level: 0 [100: Normal, no complaints, no evidence of disease.] : 100: Normal, no complaints, no evidence of disease. [ECOG Performance Status: 0 - Fully active, able to carry on all pre-disease performance without restriction] : Performance Status: 0 - Fully active, able to carry on all pre-disease performance without restriction [de-identified] : Yovani is a 18 year old transgender male (female to male) with history of hypertriglyceridemia, here for further evaluation of polycythemia. He was referred by Endocrinologist MD Mejia. Polycythemia noted in EMR since 6/12/23 H/H 17.1/51.6. Recent labs from 11/22/23 reported H/H 17.5/51.6. Patient has been on hormone replacement of testosterone since 2020. Currently taking 120mg (0.5ml) every 2 weeks. Patient was advised to have a phlebotomy done at a blood donation center, but was unable to due to polycythemia.   Overall, patient feels well. Denies any fever/chills, night sweats, unintentional weight loss, fatigue, headache, vision changes, paresthesia, pruritus, erythromelalgia. Does report snoring at night, and at times does take naps during the day. Denies history of sleep apnea.

## 2024-01-08 LAB
25(OH)D3 SERPL-MCNC: 23.3 NG/ML
ALBUMIN SERPL ELPH-MCNC: 5 G/DL
ALP BLD-CCNC: 69 U/L
ALT SERPL-CCNC: 80 U/L
ANION GAP SERPL CALC-SCNC: 12 MMOL/L
APPEARANCE: CLEAR
AST SERPL-CCNC: 51 U/L
BASOPHILS # BLD AUTO: 0.04 K/UL
BASOPHILS NFR BLD AUTO: 0.6 %
BILIRUB SERPL-MCNC: 1.2 MG/DL
BILIRUBIN URINE: NEGATIVE
BLOOD URINE: ABNORMAL
BUN SERPL-MCNC: 14 MG/DL
C TRACH RRNA SPEC QL NAA+PROBE: NOT DETECTED
CALCIUM SERPL-MCNC: 10.1 MG/DL
CHLORIDE SERPL-SCNC: 101 MMOL/L
CHOLEST SERPL-MCNC: 267 MG/DL
CO2 SERPL-SCNC: 23 MMOL/L
COLOR: YELLOW
CREAT SERPL-MCNC: 0.82 MG/DL
EGFR: 129 ML/MIN/1.73M2
EOSINOPHIL # BLD AUTO: 0.16 K/UL
EOSINOPHIL NFR BLD AUTO: 2.4 %
ESTIMATED AVERAGE GLUCOSE: 97 MG/DL
ESTRADIOL SERPL-MCNC: 22 PG/ML
GLUCOSE QUALITATIVE U: NEGATIVE MG/DL
GLUCOSE SERPL-MCNC: 91 MG/DL
HBA1C MFR BLD HPLC: 5 %
HBV CORE IGG+IGM SER QL: NONREACTIVE
HBV SURFACE AB SERPL IA-ACNC: <3 MIU/ML
HBV SURFACE AG SER QL: NONREACTIVE
HCT VFR BLD CALC: 50.5 %
HCV AB SER QL: NONREACTIVE
HCV S/CO RATIO: 0.13 S/CO
HDLC SERPL-MCNC: 36 MG/DL
HEPATITIS A IGG ANTIBODY: REACTIVE
HGB BLD-MCNC: 16.7 G/DL
HIV1+2 AB SPEC QL IA.RAPID: NONREACTIVE
IMM GRANULOCYTES NFR BLD AUTO: 0.3 %
KETONES URINE: NEGATIVE MG/DL
LDLC SERPL CALC-MCNC: 160 MG/DL
LEUKOCYTE ESTERASE URINE: ABNORMAL
LYMPHOCYTES # BLD AUTO: 2.02 K/UL
LYMPHOCYTES NFR BLD AUTO: 30.7 %
MAN DIFF?: NORMAL
MCHC RBC-ENTMCNC: 30.3 PG
MCHC RBC-ENTMCNC: 33.1 GM/DL
MCV RBC AUTO: 91.7 FL
MONOCYTES # BLD AUTO: 0.42 K/UL
MONOCYTES NFR BLD AUTO: 6.4 %
N GONORRHOEA RRNA SPEC QL NAA+PROBE: NOT DETECTED
NEUTROPHILS # BLD AUTO: 3.92 K/UL
NEUTROPHILS NFR BLD AUTO: 59.6 %
NITRITE URINE: NEGATIVE
NONHDLC SERPL-MCNC: 231 MG/DL
PH URINE: 6
PLATELET # BLD AUTO: 340 K/UL
POTASSIUM SERPL-SCNC: 4.3 MMOL/L
PROT SERPL-MCNC: 7.1 G/DL
PROTEIN URINE: NEGATIVE MG/DL
RBC # BLD: 5.51 M/UL
RBC # FLD: 13.1 %
SODIUM SERPL-SCNC: 136 MMOL/L
SOURCE ORAL: NORMAL
SPECIFIC GRAVITY URINE: 1.03
T PALLIDUM AB SER QL IA: NEGATIVE
TESTOST SERPL-MCNC: 470 NG/DL
THYROGLOB AB SERPL-ACNC: <20 IU/ML
THYROPEROXIDASE AB SERPL IA-ACNC: 12.2 IU/ML
TRIGL SERPL-MCNC: 373 MG/DL
TSH SERPL-ACNC: 1.01 UIU/ML
UROBILINOGEN URINE: 0.2 MG/DL
WBC # FLD AUTO: 6.58 K/UL

## 2024-01-09 LAB
C TRACH RRNA SPEC QL NAA+PROBE: NOT DETECTED
N GONORRHOEA RRNA SPEC QL NAA+PROBE: NOT DETECTED
SOURCE AMPLIFICATION: NORMAL

## 2024-01-16 LAB
ALBUMIN SERPL ELPH-MCNC: 4.8 G/DL
ALP BLD-CCNC: 71 U/L
ALT SERPL-CCNC: 58 U/L
ANION GAP SERPL CALC-SCNC: 10 MMOL/L
AST SERPL-CCNC: 40 U/L
BILIRUB SERPL-MCNC: 1 MG/DL
BUN SERPL-MCNC: 19 MG/DL
CALCIUM SERPL-MCNC: 10.1 MG/DL
CHLORIDE SERPL-SCNC: 99 MMOL/L
CO2 SERPL-SCNC: 26 MMOL/L
CREAT SERPL-MCNC: 0.92 MG/DL
EGFR: 122 ML/MIN/1.73M2
EPO SERPL-MCNC: 5.4 MIU/ML
GLUCOSE SERPL-MCNC: 93 MG/DL
JAK2 P.V617F BLD/T QL: NORMAL
POTASSIUM SERPL-SCNC: 4.8 MMOL/L
PROT SERPL-MCNC: 7.2 G/DL
REFLEX:: NORMAL
SODIUM SERPL-SCNC: 136 MMOL/L

## 2024-01-17 ENCOUNTER — APPOINTMENT (OUTPATIENT)
Dept: FAMILY MEDICINE | Facility: CLINIC | Age: 19
End: 2024-01-17
Payer: COMMERCIAL

## 2024-01-17 ENCOUNTER — APPOINTMENT (OUTPATIENT)
Dept: INFUSION THERAPY | Facility: HOSPITAL | Age: 19
End: 2024-01-17

## 2024-01-17 VITALS
TEMPERATURE: 99 F | RESPIRATION RATE: 16 BRPM | WEIGHT: 187 LBS | DIASTOLIC BLOOD PRESSURE: 70 MMHG | HEART RATE: 73 BPM | SYSTOLIC BLOOD PRESSURE: 118 MMHG | BODY MASS INDEX: 33.13 KG/M2 | OXYGEN SATURATION: 98 % | HEIGHT: 63 IN

## 2024-01-17 DIAGNOSIS — Z23 ENCOUNTER FOR IMMUNIZATION: ICD-10-CM

## 2024-01-17 DIAGNOSIS — Z71.1 PERSON WITH FEARED HEALTH COMPLAINT IN WHOM NO DIAGNOSIS IS MADE: ICD-10-CM

## 2024-01-17 PROCEDURE — 99212 OFFICE O/P EST SF 10 MIN: CPT | Mod: 25

## 2024-01-17 PROCEDURE — 90471 IMMUNIZATION ADMIN: CPT

## 2024-01-17 PROCEDURE — 90651 9VHPV VACCINE 2/3 DOSE IM: CPT

## 2024-01-17 NOTE — PHYSICAL EXAM
[No Acute Distress] : no acute distress [Well Nourished] : well nourished [Well Developed] : well developed [Well-Appearing] : well-appearing [Normal Voice/Communication] : normal voice/communication [Normal Mood] : the mood was normal

## 2024-01-17 NOTE — HISTORY OF PRESENT ILLNESS
[FreeTextEntry1] : patient presents as never have been to the gynecologist and would like to speak to you about that. [de-identified] : He also wants to discuss the HPV vaccine, he isn't sure he received any in the past.

## 2024-01-17 NOTE — PLAN
[FreeTextEntry1] : I reviewed the immunization record and he received two doses in 2021 and the third was administered today.  He will follow up with me for a PAP after his 19st birthday.

## 2024-03-19 ENCOUNTER — OUTPATIENT (OUTPATIENT)
Dept: OUTPATIENT SERVICES | Facility: HOSPITAL | Age: 19
LOS: 1 days | Discharge: HOME | End: 2024-03-19

## 2024-03-19 DIAGNOSIS — D45 POLYCYTHEMIA VERA: ICD-10-CM

## 2024-03-29 ENCOUNTER — RESULT REVIEW (OUTPATIENT)
Age: 19
End: 2024-03-29

## 2024-03-29 ENCOUNTER — APPOINTMENT (OUTPATIENT)
Dept: HEMATOLOGY ONCOLOGY | Facility: CLINIC | Age: 19
End: 2024-03-29
Payer: COMMERCIAL

## 2024-03-29 VITALS
BODY MASS INDEX: 32.03 KG/M2 | WEIGHT: 180.78 LBS | RESPIRATION RATE: 16 BRPM | HEIGHT: 63 IN | SYSTOLIC BLOOD PRESSURE: 117 MMHG | DIASTOLIC BLOOD PRESSURE: 73 MMHG | TEMPERATURE: 97.3 F | OXYGEN SATURATION: 97 % | HEART RATE: 64 BPM

## 2024-03-29 DIAGNOSIS — Z86.2 PERSONAL HISTORY OF DISEASES OF THE BLOOD AND BLOOD-FORMING ORGANS AND CERTAIN DISORDERS INVOLVING THE IMMUNE MECHANISM: ICD-10-CM

## 2024-03-29 LAB
BASOPHILS # BLD AUTO: 0.05 K/UL — SIGNIFICANT CHANGE UP (ref 0–0.2)
BASOPHILS NFR BLD AUTO: 0.8 % — SIGNIFICANT CHANGE UP (ref 0–2)
EOSINOPHIL # BLD AUTO: 0.14 K/UL — SIGNIFICANT CHANGE UP (ref 0–0.5)
EOSINOPHIL NFR BLD AUTO: 2.3 % — SIGNIFICANT CHANGE UP (ref 0–6)
HCT VFR BLD CALC: 51 % — HIGH (ref 39–50)
HGB BLD-MCNC: 17.6 G/DL — HIGH (ref 13–17)
IMM GRANULOCYTES NFR BLD AUTO: 0.2 % — SIGNIFICANT CHANGE UP (ref 0–0.9)
LYMPHOCYTES # BLD AUTO: 1.8 K/UL — SIGNIFICANT CHANGE UP (ref 1–3.3)
LYMPHOCYTES # BLD AUTO: 29.2 % — SIGNIFICANT CHANGE UP (ref 13–44)
MCHC RBC-ENTMCNC: 30.4 PG — SIGNIFICANT CHANGE UP (ref 27–34)
MCHC RBC-ENTMCNC: 34.5 G/DL — SIGNIFICANT CHANGE UP (ref 32–36)
MCV RBC AUTO: 88.1 FL — SIGNIFICANT CHANGE UP (ref 80–100)
MONOCYTES # BLD AUTO: 0.42 K/UL — SIGNIFICANT CHANGE UP (ref 0–0.9)
MONOCYTES NFR BLD AUTO: 6.8 % — SIGNIFICANT CHANGE UP (ref 2–14)
NEUTROPHILS # BLD AUTO: 3.74 K/UL — SIGNIFICANT CHANGE UP (ref 1.8–7.4)
NEUTROPHILS NFR BLD AUTO: 60.7 % — SIGNIFICANT CHANGE UP (ref 43–77)
NRBC # BLD: 0 /100 WBCS — SIGNIFICANT CHANGE UP (ref 0–0)
PLATELET # BLD AUTO: 303 K/UL — SIGNIFICANT CHANGE UP (ref 150–400)
RBC # BLD: 5.79 M/UL — SIGNIFICANT CHANGE UP (ref 4.2–5.8)
RBC # FLD: 12.3 % — SIGNIFICANT CHANGE UP (ref 10.3–14.5)
WBC # BLD: 6.16 K/UL — SIGNIFICANT CHANGE UP (ref 3.8–10.5)
WBC # FLD AUTO: 6.16 K/UL — SIGNIFICANT CHANGE UP (ref 3.8–10.5)

## 2024-03-29 PROCEDURE — 99214 OFFICE O/P EST MOD 30 MIN: CPT

## 2024-03-30 LAB
FERRITIN SERPL-MCNC: 92 NG/ML
FOLATE SERPL-MCNC: 12.9 NG/ML
IRON SATN MFR SERPL: 50 %
IRON SERPL-MCNC: 158 UG/DL
TIBC SERPL-MCNC: 315 UG/DL
UIBC SERPL-MCNC: 157 UG/DL
VIT B12 SERPL-MCNC: 332 PG/ML

## 2024-03-30 NOTE — HISTORY OF PRESENT ILLNESS
[de-identified] : Patient remains on same doses of transition hormone medication. Feels well, no problems, Patient was actually unable to get in contact with the [place] office or Narendra Vera hematologist. Will have patient call himself and we and/or we can reach out to the [facility] offices again.  reach out to them again.

## 2024-03-30 NOTE — ASSESSMENT
[FreeTextEntry1] : Yovani is a 18 year old transgender male (female to male) with history of hypertriglyceridemia, here for further evaluation of polycythemia. He was referred by Endocrinologist MD Mejia. Polycythemia noted in EMR since 6/12/23 H/H 17.1/51.6. Recent labs from 11/22/23 reported H/H 17.5/51.6. Patient has been on hormone replacement of testosterone since 2020. MICHELLE 2 negative, HCT in the low 5's range usually 49-52%, not high enough to greatly increase the risk of thrombosis.  Patient lives in [place] making his trips to our center extremely taxing. Will see if we can find him a hematologist in [facility] [place] that can accommodate phlebotomy for transgender patient.

## 2024-05-29 ENCOUNTER — APPOINTMENT (OUTPATIENT)
Dept: TRANSGENDER CARE | Facility: CLINIC | Age: 19
End: 2024-05-29
Payer: COMMERCIAL

## 2024-05-29 VITALS
BODY MASS INDEX: 31.71 KG/M2 | OXYGEN SATURATION: 97 % | TEMPERATURE: 98.7 F | SYSTOLIC BLOOD PRESSURE: 110 MMHG | HEART RATE: 78 BPM | HEIGHT: 63 IN | DIASTOLIC BLOOD PRESSURE: 60 MMHG | WEIGHT: 179 LBS

## 2024-05-29 DIAGNOSIS — N92.0 EXCESSIVE AND FREQUENT MENSTRUATION WITH REGULAR CYCLE: ICD-10-CM

## 2024-05-29 DIAGNOSIS — F64.0 TRANSSEXUALISM: ICD-10-CM

## 2024-05-29 DIAGNOSIS — D75.1 SECONDARY POLYCYTHEMIA: ICD-10-CM

## 2024-05-29 DIAGNOSIS — F41.1 GENERALIZED ANXIETY DISORDER: ICD-10-CM

## 2024-05-29 PROCEDURE — 99214 OFFICE O/P EST MOD 30 MIN: CPT

## 2024-05-29 PROCEDURE — 36415 COLL VENOUS BLD VENIPUNCTURE: CPT

## 2024-05-29 PROCEDURE — G2211 COMPLEX E/M VISIT ADD ON: CPT

## 2024-05-29 NOTE — PLAN
[FreeTextEntry1] : Gender Dysphoria: stable. Will order routine blood work and f/u results to patient once made available - will send [facility] message with results and dose adjustment if needed. F/u 3 months. Advised patient to call with any questions or concerns. Patient verbalized understanding.   Polycythemia: well controlled, continue care under specialist. Educated pt to go to hospital with any worst headache of life/thunderclap headache, gait disturbances, chest pain, palpitations, acute neuro changes (slurred speech, muscle weakness, numbness/tingling, visual disturbances etc.) .Advised patient to call with any questions or concerns. Patient verbalized understanding.   Anxiety: stable, patient denies any thoughts of hurting self or anyone else at time of visit. Continue care under psych/therapist. Advised patient to call with any questions or concerns. Patient verbalized understanding.

## 2024-05-29 NOTE — HEALTH RISK ASSESSMENT
[Patient declined PAP Smear] : Patient declined PAP Smear [HIV test declined] : HIV test declined [Hepatitis C test declined] : Hepatitis C test declined [PapSmearComments] : scheduling with GP, will let us know results

## 2024-05-29 NOTE — HISTORY OF PRESENT ILLNESS
[FreeTextEntry1] : f/u [de-identified] : Patient here today for f/u   Gender Dysphoria: Patient has been on gaht for a long time, feb 2020 started gaht. He has always been on injections. He reports stable on current regimen. Patient has been feeling good on gaht. He reports had spotting last week towards end of testosterone cycle, he denies any weight changes. He has been on biweekly injections for a long time, open to weekly. 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 Takes Prozac 40mg from his PCP. Ernesto Strickland is his therapist. He will be seeing a psychiatrist at [facility], denies any thoughts of hurting self or anyone else.  Sexual Hx: Relationship status: single Partners (tell me about the genders and bodies of partners, any sperm producing partners): cis men Practices (types of sex, monogamous/polyamorous): oral and vaginal Protections from STIs (condoms, OCP, LARC, PrEP, etc.): condoms Past Hx of STIs: none Pregnancy (Desire or Hx): neither LMP: spotting, may 2024 - spotting last then was 2022  Pap: has not had yet but will be scheduling with GP   Patient declines STI testing today. Patient reports 1 sexual partners in the last 3 months. Last sexual encounter was 2 weeks ago   Patient denies any headache visual changes, dizziness, chest pain, SOB, palpitations, wheezing, cough, abdominal pain, nausea, vomiting, diarrhea, joint pain, leg swelling or leg pain. No other health concerns today.

## 2024-05-30 ENCOUNTER — TRANSCRIPTION ENCOUNTER (OUTPATIENT)
Age: 19
End: 2024-05-30

## 2024-05-30 LAB
ALBUMIN SERPL ELPH-MCNC: 4.6 G/DL
ALP BLD-CCNC: 70 U/L
ALT SERPL-CCNC: 24 U/L
ANION GAP SERPL CALC-SCNC: 12 MMOL/L
AST SERPL-CCNC: 17 U/L
BASOPHILS # BLD AUTO: 0.04 K/UL
BASOPHILS NFR BLD AUTO: 0.5 %
BILIRUB SERPL-MCNC: 1 MG/DL
BUN SERPL-MCNC: 12 MG/DL
CALCIUM SERPL-MCNC: 9.9 MG/DL
CHLORIDE SERPL-SCNC: 103 MMOL/L
CO2 SERPL-SCNC: 25 MMOL/L
CREAT SERPL-MCNC: 0.95 MG/DL
EGFR: 117 ML/MIN/1.73M2
EOSINOPHIL # BLD AUTO: 0.08 K/UL
EOSINOPHIL NFR BLD AUTO: 1 %
ESTRADIOL SERPL-MCNC: 19 PG/ML
GLUCOSE SERPL-MCNC: 106 MG/DL
HCT VFR BLD CALC: 52.5 %
HGB BLD-MCNC: 17.3 G/DL
IMM GRANULOCYTES NFR BLD AUTO: 0.2 %
LYMPHOCYTES # BLD AUTO: 1.8 K/UL
LYMPHOCYTES NFR BLD AUTO: 22.4 %
MAN DIFF?: NORMAL
MCHC RBC-ENTMCNC: 31.1 PG
MCHC RBC-ENTMCNC: 33 GM/DL
MCV RBC AUTO: 94.4 FL
MONOCYTES # BLD AUTO: 0.47 K/UL
MONOCYTES NFR BLD AUTO: 5.9 %
NEUTROPHILS # BLD AUTO: 5.62 K/UL
NEUTROPHILS NFR BLD AUTO: 70 %
PLATELET # BLD AUTO: 327 K/UL
POTASSIUM SERPL-SCNC: 4.1 MMOL/L
PROT SERPL-MCNC: 6.8 G/DL
RBC # BLD: 5.56 M/UL
RBC # FLD: 13.4 %
SODIUM SERPL-SCNC: 141 MMOL/L
TESTOST SERPL-MCNC: 268 NG/DL
WBC # FLD AUTO: 8.03 K/UL

## 2024-05-30 RX ORDER — TESTOSTERONE CYPIONATE 200 MG/ML
200 INJECTION, SOLUTION INTRAMUSCULAR
Qty: 12 | Refills: 0 | Status: ACTIVE | COMMUNITY
Start: 2021-02-18 | End: 1900-01-01

## 2024-05-30 RX ORDER — SYRINGE, DISPOSABLE, 1 ML
1 ML SYRINGE, EMPTY DISPOSABLE MISCELLANEOUS
Qty: 1 | Refills: 0 | Status: ACTIVE | COMMUNITY
Start: 2020-02-04 | End: 1900-01-01

## 2024-05-31 ENCOUNTER — TRANSCRIPTION ENCOUNTER (OUTPATIENT)
Age: 19
End: 2024-05-31

## 2024-06-17 ENCOUNTER — RX RENEWAL (OUTPATIENT)
Age: 19
End: 2024-06-17

## 2024-06-18 RX ORDER — OMEGA-3-ACID ETHYL ESTERS CAPSULES 1 G/1
1 CAPSULE, LIQUID FILLED ORAL TWICE DAILY
Qty: 360 | Refills: 0 | Status: ACTIVE | COMMUNITY
Start: 2023-06-23 | End: 1900-01-01

## 2024-08-14 ENCOUNTER — TRANSCRIPTION ENCOUNTER (OUTPATIENT)
Age: 19
End: 2024-08-14

## 2024-08-16 ENCOUNTER — NON-APPOINTMENT (OUTPATIENT)
Age: 19
End: 2024-08-16

## 2024-08-16 ENCOUNTER — APPOINTMENT (OUTPATIENT)
Dept: CARDIOLOGY | Facility: CLINIC | Age: 19
End: 2024-08-16
Payer: COMMERCIAL

## 2024-08-16 VITALS
OXYGEN SATURATION: 97 % | BODY MASS INDEX: 31.89 KG/M2 | DIASTOLIC BLOOD PRESSURE: 70 MMHG | HEIGHT: 63 IN | SYSTOLIC BLOOD PRESSURE: 130 MMHG | HEART RATE: 68 BPM | WEIGHT: 180 LBS

## 2024-08-16 DIAGNOSIS — E78.2 MIXED HYPERLIPIDEMIA: ICD-10-CM

## 2024-08-16 DIAGNOSIS — Z13.6 ENCOUNTER FOR SCREENING FOR CARDIOVASCULAR DISORDERS: ICD-10-CM

## 2024-08-16 PROCEDURE — G2211 COMPLEX E/M VISIT ADD ON: CPT

## 2024-08-16 PROCEDURE — 99214 OFFICE O/P EST MOD 30 MIN: CPT

## 2024-08-16 PROCEDURE — 93000 ELECTROCARDIOGRAM COMPLETE: CPT

## 2024-08-16 RX ORDER — FENOFIBRATE 145 MG/1
145 TABLET, COATED ORAL
Qty: 90 | Refills: 3 | Status: ACTIVE | COMMUNITY
Start: 2024-08-16 | End: 1900-01-01

## 2024-08-16 NOTE — DISCUSSION/SUMMARY
[FreeTextEntry1] : 1. Hypertriglyceridemia/dyslipidemia: patient with no history of pancreatitis. Reviewed lipid panels dating back to 2019. Persistently elevated. Recommend low fat diet. On Lovaza 2gm BID. Will add Fenofibrate 145mg daily. Recheck lipid panel and CMP in 2-3 months.  Follow up in 1 year. [EKG obtained to assist in diagnosis and management of assessed problem(s)] : EKG obtained to assist in diagnosis and management of assessed problem(s)

## 2024-08-16 NOTE — CARDIOLOGY SUMMARY
[de-identified] : 8/16/2024, NSR with sinus arrhythmia, right axis deviation. 6/23/2023, NSR with right axis deviation.

## 2024-08-16 NOTE — HISTORY OF PRESENT ILLNESS
[FreeTextEntry1] : Historical Perspective: 19 year old transgender (Female-->male) presents for a cardiac evaluation. Hypertriglyceridemia over 300 since at least 2019. Patient now on testosterone therapy, which was started in 2020, however elevated triglyceride levels in the 300s were seen prior to this.  No history of pancreatitis.  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 During the school year diet is unhealthy.  Patient has no chest pain, dyspnea or palpitations.  There is no known family history in first degree relatives of cardiovascular disease.  There is no history of MI, CVA, CHF, or previous coronary intervention.  Current Health Status: Patient with no chest pain, SOB, or palpitations. No hospitalizations since seeing me last. Remains compliant with medications and reports no adverse effects.

## 2024-08-21 NOTE — BRIEF OPERATIVE NOTE - ANTIBIOTIC PROTOCOL
Unable to reach. Left message  
Followed protocol
uvula midline, no vesicles, no redness, and no oropharyngeal exudate.

## 2024-08-24 ENCOUNTER — NON-APPOINTMENT (OUTPATIENT)
Age: 19
End: 2024-08-24

## 2024-09-05 ENCOUNTER — APPOINTMENT (OUTPATIENT)
Dept: TRANSGENDER CARE | Facility: CLINIC | Age: 19
End: 2024-09-05
Payer: COMMERCIAL

## 2024-09-05 DIAGNOSIS — F64.0 TRANSSEXUALISM: ICD-10-CM

## 2024-09-05 DIAGNOSIS — E78.5 HYPERLIPIDEMIA, UNSPECIFIED: ICD-10-CM

## 2024-09-05 DIAGNOSIS — F41.1 GENERALIZED ANXIETY DISORDER: ICD-10-CM

## 2024-09-05 PROCEDURE — 99213 OFFICE O/P EST LOW 20 MIN: CPT

## 2024-09-05 PROCEDURE — G2211 COMPLEX E/M VISIT ADD ON: CPT

## 2024-09-05 NOTE — HISTORY OF PRESENT ILLNESS
[Home] : at home, [unfilled] , at the time of the visit. [Medical Office: ([facility])___] : at the medical office located in  [Verbal consent obtained from patient] : the patient, [unfilled] [FreeTextEntry1] : f/u [de-identified] : Patient here today for f/u   Gender Dysphoria: Patient has been on gaht for a long time, feb 2020 started gaht. He reports feeling good on weekly injections, has been easier to have same schedule weekly. 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 Takes Prozac 40mg from his PCP. Ernesto Florencechristen is his therapist. He will be seeing a psychiatrist at [facility], denies any thoughts of hurting self or anyone else.  Sexual Hx: Relationship status: single Partners (tell me about the genders and bodies of partners, any sperm producing partners): cis men Practices (types of sex, monogamous/polyamorous): oral and vaginal Protections from STIs (condoms, OCP, LARC, PrEP, etc.): condoms Past Hx of STIs: none Pregnancy (Desire or Hx): neither LMP: spotting, may 2024 - spotting last then was 2022  Pap: has not had yet but will be scheduling with GP   Patient declines STI testing today. Patient reports 1 sexual partners in the last 3 months. Last sexual encounter was 2 weeks ago   Patient denies any headache visual changes, dizziness, chest pain, SOB, palpitations, wheezing, cough, abdominal pain, nausea, vomiting, diarrhea, joint pain, leg swelling or leg pain. No other health concerns today.

## 2024-09-05 NOTE — PLAN
[FreeTextEntry1] : Gender Dysphoria: stable. Will order routine blood work and f/u results to patient once made available - will send [facility] message with results and dose adjustment if needed. F/u 3 months. Advised patient to call with any questions or concerns. Patient verbalized understanding.   Polycythemia: well controlled, continue care under specialist. Educated pt to go to hospital with any worst headache of life/thunderclap headache, gait disturbances, chest pain, palpitations, acute neuro changes (slurred speech, muscle weakness, numbness/tingling, visual disturbances etc.) .Advised patient to call with any questions or concerns. Patient verbalized understanding.   Anxiety: stable, patient denies any thoughts of hurting self or anyone else at time of visit. Continue care under psych/therapist. Advised patient to call with any questions or concerns. Patient verbalized understanding.   HLD: well controlled, continue care under specialist. Advised patient to call with any questions or concerns. Patient verbalized understanding.

## 2024-09-20 ENCOUNTER — RX RENEWAL (OUTPATIENT)
Age: 19
End: 2024-09-20

## 2024-11-18 ENCOUNTER — TRANSCRIPTION ENCOUNTER (OUTPATIENT)
Age: 19
End: 2024-11-18

## 2024-12-02 ENCOUNTER — APPOINTMENT (OUTPATIENT)
Dept: TRANSGENDER CARE | Facility: CLINIC | Age: 19
End: 2024-12-02
Payer: COMMERCIAL

## 2024-12-02 DIAGNOSIS — F41.1 GENERALIZED ANXIETY DISORDER: ICD-10-CM

## 2024-12-02 DIAGNOSIS — E78.5 HYPERLIPIDEMIA, UNSPECIFIED: ICD-10-CM

## 2024-12-02 DIAGNOSIS — F64.0 TRANSSEXUALISM: ICD-10-CM

## 2024-12-02 PROCEDURE — 99213 OFFICE O/P EST LOW 20 MIN: CPT

## 2024-12-02 PROCEDURE — G2211 COMPLEX E/M VISIT ADD ON: CPT | Mod: NC

## 2025-01-02 ENCOUNTER — TRANSCRIPTION ENCOUNTER (OUTPATIENT)
Age: 20
End: 2025-01-02

## 2025-04-17 NOTE — ASSESSMENT
PA initiated and all questions answered    [FreeTextEntry1] : Wound care and activity restrictions were reviewed.  Follow-up in 1 week for wound check.

## 2025-07-28 ENCOUNTER — TRANSCRIPTION ENCOUNTER (OUTPATIENT)
Age: 20
End: 2025-07-28

## 2025-08-05 ENCOUNTER — TRANSCRIPTION ENCOUNTER (OUTPATIENT)
Age: 20
End: 2025-08-05

## 2025-08-14 ENCOUNTER — TRANSCRIPTION ENCOUNTER (OUTPATIENT)
Age: 20
End: 2025-08-14

## 2025-08-14 LAB
ALBUMIN SERPL ELPH-MCNC: 4.5 G/DL
ALP BLD-CCNC: 71 U/L
ALT SERPL-CCNC: 94 U/L
ANION GAP SERPL CALC-SCNC: 14 MMOL/L
AST SERPL-CCNC: 41 U/L
BILIRUB SERPL-MCNC: 0.8 MG/DL
BUN SERPL-MCNC: 17 MG/DL
CALCIUM SERPL-MCNC: 9.6 MG/DL
CHLORIDE SERPL-SCNC: 104 MMOL/L
CO2 SERPL-SCNC: 23 MMOL/L
CREAT SERPL-MCNC: 0.9 MG/DL
EGFRCR SERPLBLD CKD-EPI 2021: 124 ML/MIN/1.73M2
GLUCOSE SERPL-MCNC: 102 MG/DL
POTASSIUM SERPL-SCNC: 5.2 MMOL/L
PROT SERPL-MCNC: 6.9 G/DL
SODIUM SERPL-SCNC: 141 MMOL/L

## 2025-08-15 LAB
25(OH)D3 SERPL-MCNC: 26.6 NG/ML
BASOPHILS # BLD AUTO: 0.05 K/UL
BASOPHILS NFR BLD AUTO: 0.7 %
CHOLEST SERPL-MCNC: 210 MG/DL
EOSINOPHIL # BLD AUTO: 0.12 K/UL
EOSINOPHIL NFR BLD AUTO: 1.8 %
ESTIMATED AVERAGE GLUCOSE: 100 MG/DL
ESTRADIOL SERPL-MCNC: 19 PG/ML
HBA1C MFR BLD HPLC: 5.1 %
HCT VFR BLD CALC: 50.5 %
HDLC SERPL-MCNC: 34 MG/DL
HGB BLD-MCNC: 16.3 G/DL
IMM GRANULOCYTES NFR BLD AUTO: 0.1 %
LDLC SERPL-MCNC: 128 MG/DL
LYMPHOCYTES # BLD AUTO: 2.32 K/UL
LYMPHOCYTES NFR BLD AUTO: 33.9 %
MAN DIFF?: NORMAL
MCHC RBC-ENTMCNC: 30.8 PG
MCHC RBC-ENTMCNC: 32.3 G/DL
MCV RBC AUTO: 95.5 FL
MONOCYTES # BLD AUTO: 0.53 K/UL
MONOCYTES NFR BLD AUTO: 7.7 %
NEUTROPHILS # BLD AUTO: 3.81 K/UL
NEUTROPHILS NFR BLD AUTO: 55.8 %
NONHDLC SERPL-MCNC: 176 MG/DL
PLATELET # BLD AUTO: 315 K/UL
RBC # BLD: 5.29 M/UL
RBC # FLD: 13.5 %
TESTOST SERPL-MCNC: 498 NG/DL
TRIGL SERPL-MCNC: 270 MG/DL
TSH SERPL-ACNC: 1.34 UIU/ML
WBC # FLD AUTO: 6.84 K/UL

## 2025-08-22 ENCOUNTER — TRANSCRIPTION ENCOUNTER (OUTPATIENT)
Age: 20
End: 2025-08-22

## 2025-08-28 ENCOUNTER — APPOINTMENT (OUTPATIENT)
Dept: TRANSGENDER CARE | Facility: CLINIC | Age: 20
End: 2025-08-28
Payer: COMMERCIAL

## 2025-08-28 DIAGNOSIS — E78.5 HYPERLIPIDEMIA, UNSPECIFIED: ICD-10-CM

## 2025-08-28 DIAGNOSIS — R79.89 OTHER SPECIFIED ABNORMAL FINDINGS OF BLOOD CHEMISTRY: ICD-10-CM

## 2025-08-28 DIAGNOSIS — F39 UNSPECIFIED MOOD [AFFECTIVE] DISORDER: ICD-10-CM

## 2025-08-28 DIAGNOSIS — F64.0 TRANSSEXUALISM: ICD-10-CM

## 2025-08-28 DIAGNOSIS — F41.1 GENERALIZED ANXIETY DISORDER: ICD-10-CM

## 2025-08-28 PROCEDURE — 99214 OFFICE O/P EST MOD 30 MIN: CPT | Mod: 95

## 2025-08-28 PROCEDURE — G2211 COMPLEX E/M VISIT ADD ON: CPT | Mod: NC,95
